# Patient Record
Sex: FEMALE | Race: WHITE | Employment: OTHER | URBAN - METROPOLITAN AREA
[De-identification: names, ages, dates, MRNs, and addresses within clinical notes are randomized per-mention and may not be internally consistent; named-entity substitution may affect disease eponyms.]

---

## 2024-05-02 ENCOUNTER — APPOINTMENT (OUTPATIENT)
Dept: CT IMAGING | Age: 84
DRG: 193 | End: 2024-05-02
Payer: MEDICARE

## 2024-05-02 ENCOUNTER — APPOINTMENT (OUTPATIENT)
Dept: GENERAL RADIOLOGY | Age: 84
DRG: 193 | End: 2024-05-02
Payer: MEDICARE

## 2024-05-02 ENCOUNTER — HOSPITAL ENCOUNTER (INPATIENT)
Age: 84
LOS: 2 days | Discharge: HOME | DRG: 193 | End: 2024-05-04
Attending: EMERGENCY MEDICINE | Admitting: STUDENT IN AN ORGANIZED HEALTH CARE EDUCATION/TRAINING PROGRAM
Payer: MEDICARE

## 2024-05-02 DIAGNOSIS — K85.90 ACUTE PANCREATITIS, UNSPECIFIED COMPLICATION STATUS, UNSPECIFIED PANCREATITIS TYPE: ICD-10-CM

## 2024-05-02 DIAGNOSIS — N17.9 AKI (ACUTE KIDNEY INJURY) (HCC): ICD-10-CM

## 2024-05-02 DIAGNOSIS — J18.9 PNEUMONIA OF BOTH LOWER LOBES DUE TO INFECTIOUS ORGANISM: ICD-10-CM

## 2024-05-02 DIAGNOSIS — R19.7 DIARRHEA, UNSPECIFIED TYPE: Primary | ICD-10-CM

## 2024-05-02 DIAGNOSIS — E87.20 METABOLIC ACIDOSIS: ICD-10-CM

## 2024-05-02 DIAGNOSIS — K29.90 GASTRITIS AND DUODENITIS: ICD-10-CM

## 2024-05-02 DIAGNOSIS — E87.1 HYPONATREMIA: ICD-10-CM

## 2024-05-02 LAB
ALBUMIN SERPL-MCNC: 4 G/DL (ref 3.5–5.2)
ALBUMIN/GLOB SERPL: 1.1 {RATIO} (ref 1–2.5)
ALP SERPL-CCNC: 95 U/L (ref 35–104)
ALT SERPL-CCNC: 11 U/L (ref 5–33)
ANION GAP SERPL CALCULATED.3IONS-SCNC: 19 MMOL/L (ref 9–17)
AST SERPL-CCNC: 21 U/L
BASOPHILS # BLD: 0.2 K/UL (ref 0–0.2)
BASOPHILS NFR BLD: 1 % (ref 0–2)
BILIRUB SERPL-MCNC: 0.3 MG/DL (ref 0.3–1.2)
BILIRUB UR QL STRIP: NEGATIVE
BUN SERPL-MCNC: 31 MG/DL (ref 8–23)
CALCIUM SERPL-MCNC: 10 MG/DL (ref 8.6–10.4)
CHLORIDE SERPL-SCNC: 84 MMOL/L (ref 98–107)
CLARITY UR: CLEAR
CO2 SERPL-SCNC: 19 MMOL/L (ref 20–31)
COLOR UR: YELLOW
CREAT SERPL-MCNC: 1.6 MG/DL (ref 0.5–0.9)
EOSINOPHIL # BLD: 0.2 K/UL (ref 0–0.4)
EOSINOPHILS RELATIVE PERCENT: 1 % (ref 1–4)
EPI CELLS #/AREA URNS HPF: ABNORMAL /HPF (ref 0–5)
ERYTHROCYTE [DISTWIDTH] IN BLOOD BY AUTOMATED COUNT: 15 % (ref 12.5–15.4)
GFR, ESTIMATED: 32 ML/MIN/1.73M2
GLUCOSE SERPL-MCNC: 114 MG/DL (ref 70–99)
GLUCOSE UR STRIP-MCNC: NEGATIVE MG/DL
HCT VFR BLD AUTO: 34.8 % (ref 36–46)
HGB BLD-MCNC: 11.9 G/DL (ref 12–16)
HGB UR QL STRIP.AUTO: ABNORMAL
KETONES UR STRIP-MCNC: NEGATIVE MG/DL
LACTATE BLDV-SCNC: 1 MMOL/L (ref 0.5–2.2)
LEUKOCYTE ESTERASE UR QL STRIP: NEGATIVE
LIPASE SERPL-CCNC: 296 U/L (ref 13–60)
LYMPHOCYTES NFR BLD: 2 K/UL (ref 1–4.8)
LYMPHOCYTES RELATIVE PERCENT: 13 % (ref 24–44)
MAGNESIUM SERPL-MCNC: 1.7 MG/DL (ref 1.6–2.6)
MCH RBC QN AUTO: 32.9 PG (ref 26–34)
MCHC RBC AUTO-ENTMCNC: 34.3 G/DL (ref 31–37)
MCV RBC AUTO: 96 FL (ref 80–100)
MONOCYTES NFR BLD: 0.6 K/UL (ref 0.1–1.2)
MONOCYTES NFR BLD: 4 % (ref 2–11)
NEUTROPHILS NFR BLD: 81 % (ref 36–66)
NEUTS SEG NFR BLD: 13.2 K/UL (ref 1.8–7.7)
NITRITE UR QL STRIP: NEGATIVE
PH UR STRIP: 5.5 [PH] (ref 5–8)
PLATELET # BLD AUTO: 746 K/UL (ref 140–450)
PMV BLD AUTO: 5.9 FL (ref 6–12)
POTASSIUM SERPL-SCNC: 4.1 MMOL/L (ref 3.7–5.3)
PROT SERPL-MCNC: 7.8 G/DL (ref 6.4–8.3)
PROT UR STRIP-MCNC: NEGATIVE MG/DL
RBC # BLD AUTO: 3.62 M/UL (ref 4–5.2)
RBC #/AREA URNS HPF: ABNORMAL /HPF (ref 0–2)
SODIUM SERPL-SCNC: 122 MMOL/L (ref 135–144)
SODIUM SERPL-SCNC: 125 MMOL/L (ref 135–144)
SP GR UR STRIP: 1.02 (ref 1–1.03)
TROPONIN I SERPL HS-MCNC: 14 NG/L (ref 0–14)
UROBILINOGEN UR STRIP-ACNC: NORMAL EU/DL (ref 0–1)
WBC #/AREA URNS HPF: ABNORMAL /HPF (ref 0–5)
WBC OTHER # BLD: 16.3 K/UL (ref 3.5–11)

## 2024-05-02 PROCEDURE — 2060000000 HC ICU INTERMEDIATE R&B

## 2024-05-02 PROCEDURE — 87449 NOS EACH ORGANISM AG IA: CPT

## 2024-05-02 PROCEDURE — 2580000003 HC RX 258: Performed by: NURSE PRACTITIONER

## 2024-05-02 PROCEDURE — 36415 COLL VENOUS BLD VENIPUNCTURE: CPT

## 2024-05-02 PROCEDURE — 2580000003 HC RX 258: Performed by: EMERGENCY MEDICINE

## 2024-05-02 PROCEDURE — 84484 ASSAY OF TROPONIN QUANT: CPT

## 2024-05-02 PROCEDURE — 99285 EMERGENCY DEPT VISIT HI MDM: CPT

## 2024-05-02 PROCEDURE — 6360000004 HC RX CONTRAST MEDICATION: Performed by: PHYSICIAN ASSISTANT

## 2024-05-02 PROCEDURE — 83605 ASSAY OF LACTIC ACID: CPT

## 2024-05-02 PROCEDURE — 71045 X-RAY EXAM CHEST 1 VIEW: CPT

## 2024-05-02 PROCEDURE — 85025 COMPLETE CBC W/AUTO DIFF WBC: CPT

## 2024-05-02 PROCEDURE — 84295 ASSAY OF SERUM SODIUM: CPT

## 2024-05-02 PROCEDURE — 80053 COMPREHEN METABOLIC PANEL: CPT

## 2024-05-02 PROCEDURE — A4216 STERILE WATER/SALINE, 10 ML: HCPCS | Performed by: NURSE PRACTITIONER

## 2024-05-02 PROCEDURE — 83735 ASSAY OF MAGNESIUM: CPT

## 2024-05-02 PROCEDURE — 6360000002 HC RX W HCPCS: Performed by: EMERGENCY MEDICINE

## 2024-05-02 PROCEDURE — 74177 CT ABD & PELVIS W/CONTRAST: CPT

## 2024-05-02 PROCEDURE — 6360000002 HC RX W HCPCS: Performed by: NURSE PRACTITIONER

## 2024-05-02 PROCEDURE — 87899 AGENT NOS ASSAY W/OPTIC: CPT

## 2024-05-02 PROCEDURE — 2580000003 HC RX 258: Performed by: PHYSICIAN ASSISTANT

## 2024-05-02 PROCEDURE — 81001 URINALYSIS AUTO W/SCOPE: CPT

## 2024-05-02 PROCEDURE — 83690 ASSAY OF LIPASE: CPT

## 2024-05-02 PROCEDURE — 2500000003 HC RX 250 WO HCPCS: Performed by: NURSE PRACTITIONER

## 2024-05-02 RX ORDER — AZITHROMYCIN 250 MG/1
500 TABLET, FILM COATED ORAL EVERY 24 HOURS
Status: DISCONTINUED | OUTPATIENT
Start: 2024-05-03 | End: 2024-05-04 | Stop reason: HOSPADM

## 2024-05-02 RX ORDER — ACETAMINOPHEN 650 MG/1
650 SUPPOSITORY RECTAL EVERY 6 HOURS PRN
Status: DISCONTINUED | OUTPATIENT
Start: 2024-05-02 | End: 2024-05-04 | Stop reason: HOSPADM

## 2024-05-02 RX ORDER — HYDROCODONE BITARTRATE AND ACETAMINOPHEN 5; 325 MG/1; MG/1
TABLET ORAL
Status: ON HOLD | COMMUNITY
Start: 2022-09-26 | End: 2024-05-04 | Stop reason: HOSPADM

## 2024-05-02 RX ORDER — ENOXAPARIN SODIUM 100 MG/ML
30 INJECTION SUBCUTANEOUS DAILY
Status: DISCONTINUED | OUTPATIENT
Start: 2024-05-02 | End: 2024-05-04 | Stop reason: HOSPADM

## 2024-05-02 RX ORDER — ACETAMINOPHEN/DIPHENHYDRAMINE 500MG-25MG
1 TABLET ORAL NIGHTLY PRN
COMMUNITY

## 2024-05-02 RX ORDER — ACETAMINOPHEN 325 MG/1
650 TABLET ORAL EVERY 6 HOURS PRN
Status: DISCONTINUED | OUTPATIENT
Start: 2024-05-02 | End: 2024-05-04 | Stop reason: HOSPADM

## 2024-05-02 RX ORDER — ONDANSETRON 2 MG/ML
4 INJECTION INTRAMUSCULAR; INTRAVENOUS EVERY 6 HOURS PRN
Status: DISCONTINUED | OUTPATIENT
Start: 2024-05-02 | End: 2024-05-04 | Stop reason: HOSPADM

## 2024-05-02 RX ORDER — ONDANSETRON 4 MG/1
4 TABLET, ORALLY DISINTEGRATING ORAL EVERY 8 HOURS PRN
Status: DISCONTINUED | OUTPATIENT
Start: 2024-05-02 | End: 2024-05-04 | Stop reason: HOSPADM

## 2024-05-02 RX ORDER — AZATHIOPRINE 50 MG/1
50 TABLET ORAL 2 TIMES DAILY
Status: DISCONTINUED | OUTPATIENT
Start: 2024-05-02 | End: 2024-05-03

## 2024-05-02 RX ORDER — AZATHIOPRINE 50 MG/1
TABLET ORAL
Status: ON HOLD | COMMUNITY
Start: 2023-08-21 | End: 2024-05-04 | Stop reason: HOSPADM

## 2024-05-02 RX ORDER — SODIUM CHLORIDE 0.9 % (FLUSH) 0.9 %
5-40 SYRINGE (ML) INJECTION EVERY 12 HOURS SCHEDULED
Status: DISCONTINUED | OUTPATIENT
Start: 2024-05-02 | End: 2024-05-04 | Stop reason: HOSPADM

## 2024-05-02 RX ORDER — FLUTICASONE PROPIONATE 50 MCG
1 SPRAY, SUSPENSION (ML) NASAL DAILY
COMMUNITY
Start: 2023-03-20

## 2024-05-02 RX ORDER — SODIUM CHLORIDE 0.9 % (FLUSH) 0.9 %
10 SYRINGE (ML) INJECTION PRN
Status: DISCONTINUED | OUTPATIENT
Start: 2024-05-02 | End: 2024-05-04 | Stop reason: HOSPADM

## 2024-05-02 RX ORDER — LISINOPRIL AND HYDROCHLOROTHIAZIDE 25; 20 MG/1; MG/1
1 TABLET ORAL DAILY
COMMUNITY
Start: 2023-08-15

## 2024-05-02 RX ORDER — HYDROCHLOROTHIAZIDE 25 MG/1
25 TABLET ORAL DAILY
Status: DISCONTINUED | OUTPATIENT
Start: 2024-05-03 | End: 2024-05-04 | Stop reason: HOSPADM

## 2024-05-02 RX ORDER — SODIUM CHLORIDE 9 MG/ML
INJECTION, SOLUTION INTRAVENOUS PRN
Status: DISCONTINUED | OUTPATIENT
Start: 2024-05-02 | End: 2024-05-04 | Stop reason: HOSPADM

## 2024-05-02 RX ORDER — FLUTICASONE PROPIONATE 50 MCG
1 SPRAY, SUSPENSION (ML) NASAL DAILY
Status: DISCONTINUED | OUTPATIENT
Start: 2024-05-02 | End: 2024-05-04 | Stop reason: HOSPADM

## 2024-05-02 RX ORDER — LANOLIN ALCOHOL/MO/W.PET/CERES
CREAM (GRAM) TOPICAL
COMMUNITY

## 2024-05-02 RX ORDER — ALBUTEROL SULFATE 2.5 MG/3ML
2.5 SOLUTION RESPIRATORY (INHALATION)
Status: DISCONTINUED | OUTPATIENT
Start: 2024-05-02 | End: 2024-05-04 | Stop reason: HOSPADM

## 2024-05-02 RX ORDER — SODIUM CHLORIDE 9 MG/ML
INJECTION, SOLUTION INTRAVENOUS CONTINUOUS
Status: DISCONTINUED | OUTPATIENT
Start: 2024-05-02 | End: 2024-05-04 | Stop reason: HOSPADM

## 2024-05-02 RX ORDER — IPRATROPIUM BROMIDE AND ALBUTEROL SULFATE 2.5; .5 MG/3ML; MG/3ML
1 SOLUTION RESPIRATORY (INHALATION)
Status: DISCONTINUED | OUTPATIENT
Start: 2024-05-03 | End: 2024-05-03

## 2024-05-02 RX ORDER — SODIUM CHLORIDE 0.9 % (FLUSH) 0.9 %
10 SYRINGE (ML) INJECTION 2 TIMES DAILY
Status: DISCONTINUED | OUTPATIENT
Start: 2024-05-02 | End: 2024-05-04 | Stop reason: HOSPADM

## 2024-05-02 RX ORDER — 0.9 % SODIUM CHLORIDE 0.9 %
80 INTRAVENOUS SOLUTION INTRAVENOUS ONCE
Status: DISCONTINUED | OUTPATIENT
Start: 2024-05-02 | End: 2024-05-04 | Stop reason: HOSPADM

## 2024-05-02 RX ORDER — ZOLPIDEM TARTRATE 5 MG/1
1 TABLET ORAL NIGHTLY PRN
COMMUNITY
Start: 2023-09-15

## 2024-05-02 RX ORDER — 0.9 % SODIUM CHLORIDE 0.9 %
1000 INTRAVENOUS SOLUTION INTRAVENOUS ONCE
Status: COMPLETED | OUTPATIENT
Start: 2024-05-02 | End: 2024-05-02

## 2024-05-02 RX ORDER — LISINOPRIL AND HYDROCHLOROTHIAZIDE 25; 20 MG/1; MG/1
1 TABLET ORAL DAILY
Status: DISCONTINUED | OUTPATIENT
Start: 2024-05-02 | End: 2024-05-02 | Stop reason: CLARIF

## 2024-05-02 RX ORDER — LISINOPRIL 20 MG/1
20 TABLET ORAL DAILY
Status: DISCONTINUED | OUTPATIENT
Start: 2024-05-03 | End: 2024-05-04 | Stop reason: HOSPADM

## 2024-05-02 RX ORDER — LABETALOL HYDROCHLORIDE 5 MG/ML
20 INJECTION, SOLUTION INTRAVENOUS ONCE
Status: COMPLETED | OUTPATIENT
Start: 2024-05-02 | End: 2024-05-02

## 2024-05-02 RX ORDER — ZOLPIDEM TARTRATE 5 MG/1
5 TABLET ORAL NIGHTLY PRN
Status: DISCONTINUED | OUTPATIENT
Start: 2024-05-02 | End: 2024-05-04 | Stop reason: HOSPADM

## 2024-05-02 RX ADMIN — AZITHROMYCIN MONOHYDRATE 500 MG: 500 INJECTION, POWDER, LYOPHILIZED, FOR SOLUTION INTRAVENOUS at 21:05

## 2024-05-02 RX ADMIN — FAMOTIDINE 20 MG: 10 INJECTION, SOLUTION INTRAVENOUS at 21:36

## 2024-05-02 RX ADMIN — IOPAMIDOL 75 ML: 755 INJECTION, SOLUTION INTRAVENOUS at 17:38

## 2024-05-02 RX ADMIN — LABETALOL HYDROCHLORIDE 20 MG: 5 INJECTION, SOLUTION INTRAVENOUS at 22:25

## 2024-05-02 RX ADMIN — CEFTRIAXONE SODIUM 1000 MG: 1 INJECTION, POWDER, FOR SOLUTION INTRAMUSCULAR; INTRAVENOUS at 19:43

## 2024-05-02 RX ADMIN — ENOXAPARIN SODIUM 30 MG: 100 INJECTION SUBCUTANEOUS at 21:20

## 2024-05-02 RX ADMIN — Medication 100 ML: at 17:37

## 2024-05-02 RX ADMIN — SODIUM CHLORIDE 1000 ML: 9 INJECTION, SOLUTION INTRAVENOUS at 15:50

## 2024-05-02 RX ADMIN — ONDANSETRON 4 MG: 2 INJECTION INTRAMUSCULAR; INTRAVENOUS at 22:25

## 2024-05-02 RX ADMIN — SODIUM CHLORIDE: 9 INJECTION, SOLUTION INTRAVENOUS at 20:59

## 2024-05-02 RX ADMIN — AZATHIOPRINE 50 MG: 50 TABLET ORAL at 21:36

## 2024-05-02 RX ADMIN — SODIUM CHLORIDE, PRESERVATIVE FREE 10 ML: 5 INJECTION INTRAVENOUS at 21:20

## 2024-05-02 ASSESSMENT — PAIN - FUNCTIONAL ASSESSMENT
PAIN_FUNCTIONAL_ASSESSMENT: NONE - DENIES PAIN
PAIN_FUNCTIONAL_ASSESSMENT: NONE - DENIES PAIN

## 2024-05-02 NOTE — ED PROVIDER NOTES
are present in the second portion of the duodenum which can be   seen with duodenitis.  No evidence of acute inflammatory changes seen more   distally in the small bowel.   2. Small amount of ascites.   3. Patchy bilateral airspace opacities in the lung bases, most concerning for   an infectious process.  Close radiographic follow-up until resolution is   recommended.             LABS: All lab results were reviewed by myself, and all abnormals are listed below.  Labs Reviewed   CBC WITH AUTO DIFFERENTIAL - Abnormal; Notable for the following components:       Result Value    WBC 16.3 (*)     RBC 3.62 (*)     Hemoglobin 11.9 (*)     Hematocrit 34.8 (*)     Platelets 746 (*)     MPV 5.9 (*)     Neutrophils % 81 (*)     Lymphocytes % 13 (*)     Neutrophils Absolute 13.20 (*)     All other components within normal limits   COMPREHENSIVE METABOLIC PANEL - Abnormal; Notable for the following components:    Sodium 122 (*)     Chloride 84 (*)     CO2 19 (*)     Anion Gap 19 (*)     Glucose 114 (*)     BUN 31 (*)     Creatinine 1.6 (*)     Est, Glom Filt Rate 32 (*)     All other components within normal limits   LIPASE - Abnormal; Notable for the following components:    Lipase 296 (*)     All other components within normal limits   STREP PNEUMONIAE ANTIGEN   LEGIONELLA ANTIGEN, URINE   LACTIC ACID   MAGNESIUM   TROPONIN   URINALYSIS WITH MICROSCOPIC       CONSULTS:  IP CONSULT TO HOSPITALIST    FINAL IMPRESSION      1. Diarrhea, unspecified type    2. YULIA (acute kidney injury) (HCC)    3. Hyponatremia    4. Metabolic acidosis    5. Acute pancreatitis, unspecified complication status, unspecified pancreatitis type    6. Gastritis and duodenitis    7. Pneumonia of both lower lobes due to infectious organism        PASTMEDICAL HISTORY     Past Medical History:   Diagnosis Date    Acute Crohn's disease (HCC)     Hypertension      SURGICAL HISTORY     No past surgical history on file.  CURRENT MEDICATIONS       Previous

## 2024-05-02 NOTE — ED PROVIDER NOTES
EMERGENCY DEPARTMENT ENCOUNTER      Pt Name: Valerie Young  MRN: [id number]  Birthdate 1940  Date of evaluation: 5/2/2024  Provider: Moody Flores PA-C    CHIEF COMPLAINT       Chief Complaint   Patient presents with    Diarrhea     Watery diarrhea onset a week ago wed- denies seeing any blood in stool  Pt states anytime she eats or drinks it runs straight through her  The last two days there has been a decrease in her fluid and food intake    Abdominal Pain     Mid to lower abd intermittent cramping    Fatigue    Cough     Onset a week ago tues- green phlegm   Cough is not as often per pt         HISTORY OF PRESENT ILLNESS      Valerie Young is a 84 y.o. female who presents to the emergency department complaining of diarrhea for the past week.  Patient has a history of Crohn's disease, she states that anytime she eats something she gets instant diarrhea.  She has decreased her appetite, she is complaining of some mid to lower abdominal intermittent cramping but is not that severe.  She denies any blood in her stool, denies any nausea or vomiting, denies any fevers or chills.  She contacted her gastroenterologist who told her to come for possible IV fluid for dehydration.        REVIEW OF SYSTEMS       Review of Systems   AS STATED IN HPI      PAST MEDICAL HISTORY     Past Medical History:   Diagnosis Date    Acute Crohn's disease (HCC)     Hypertension          SURGICAL HISTORY       History reviewed. No pertinent surgical history.      CURRENT MEDICATIONS       Previous Medications    AZATHIOPRINE (IMURAN) 50 MG TABLET    TAKE 1 TABLET BY MOUTH IN THE MORNING AND  ONE  TABLET  BEFORE  BEDTIME    CALCIUM CITRATE-VITAMIN D (CITRACAL+D) 315-5 MG-MCG TABS PER TABLET    0 tablets.    DIPHENHYDRAMINE-APAP, SLEEP, (TYLENOL PM EXTRA STRENGTH)  MG TABLET    Take 1 tablet by mouth nightly as needed for Sleep    FLUTICASONE (FLONASE) 50 MCG/ACT NASAL SPRAY    1 spray by Nasal route daily     her fluid and food intake    Abdominal Pain     Mid to lower abd intermittent cramping    Fatigue    Cough     Onset a week ago tues- green phlegm   Cough is not as often per pt          2)  Data Reviewed      Ekg:     LABS:  Labs Reviewed   CBC WITH AUTO DIFFERENTIAL - Abnormal; Notable for the following components:       Result Value    WBC 16.3 (*)     RBC 3.62 (*)     Hemoglobin 11.9 (*)     Hematocrit 34.8 (*)     Platelets 746 (*)     MPV 5.9 (*)     Neutrophils % 81 (*)     Lymphocytes % 13 (*)     Neutrophils Absolute 13.20 (*)     All other components within normal limits   COMPREHENSIVE METABOLIC PANEL - Abnormal; Notable for the following components:    Sodium 122 (*)     Chloride 84 (*)     CO2 19 (*)     Anion Gap 19 (*)     Glucose 114 (*)     BUN 31 (*)     Creatinine 1.6 (*)     Est, Glom Filt Rate 32 (*)     All other components within normal limits   LIPASE - Abnormal; Notable for the following components:    Lipase 296 (*)     All other components within normal limits   STREP PNEUMONIAE ANTIGEN   LEGIONELLA ANTIGEN, URINE   LACTIC ACID   MAGNESIUM   TROPONIN   URINALYSIS WITH MICROSCOPIC       RADIOLOGY:   All plain film, CT, MRI, and formal ultrasound images (except ED bedside ultrasound) are read by the radiologist  XR CHEST PORTABLE   Final Result   No acute process.         CT ABDOMEN PELVIS W IV CONTRAST Additional Contrast? None   Preliminary Result   1. Changes are present in the second portion of the duodenum which can be   seen with duodenitis.  No evidence of acute inflammatory changes seen more   distally in the small bowel.   2. Small amount of ascites.   3. Patchy bilateral airspace opacities in the lung bases, most concerning for   an infectious process.  Close radiographic follow-up until resolution is   recommended.             No results found.      3)  Treatment and Disposition    Patient repeat assessment:  stable    Due to the patient's hyponatremia, we discussed admission.

## 2024-05-03 ENCOUNTER — APPOINTMENT (OUTPATIENT)
Dept: GENERAL RADIOLOGY | Age: 84
DRG: 193 | End: 2024-05-03
Payer: MEDICARE

## 2024-05-03 PROBLEM — K29.90 GASTRITIS AND DUODENITIS: Status: ACTIVE | Noted: 2024-05-03

## 2024-05-03 PROBLEM — D75.839 THROMBOCYTOSIS: Status: ACTIVE | Noted: 2024-05-03

## 2024-05-03 PROBLEM — I10 HTN (HYPERTENSION): Status: ACTIVE | Noted: 2018-07-11

## 2024-05-03 PROBLEM — R19.7 DIARRHEA OF PRESUMED INFECTIOUS ORIGIN: Status: ACTIVE | Noted: 2024-05-03

## 2024-05-03 PROBLEM — E87.1 HYPONATREMIA: Status: ACTIVE | Noted: 2024-05-03

## 2024-05-03 PROBLEM — K85.90 ACUTE PANCREATITIS: Status: ACTIVE | Noted: 2024-05-03

## 2024-05-03 PROBLEM — E87.20 METABOLIC ACIDOSIS: Status: ACTIVE | Noted: 2024-05-03

## 2024-05-03 PROBLEM — N17.9 AKI (ACUTE KIDNEY INJURY) (HCC): Status: ACTIVE | Noted: 2024-05-03

## 2024-05-03 LAB
ANION GAP SERPL CALCULATED.3IONS-SCNC: 13 MMOL/L (ref 9–17)
BASOPHILS # BLD: 0.2 K/UL (ref 0–0.2)
BASOPHILS NFR BLD: 1 % (ref 0–2)
BUN SERPL-MCNC: 27 MG/DL (ref 8–23)
CALCIUM SERPL-MCNC: 8.8 MG/DL (ref 8.6–10.4)
CHLORIDE SERPL-SCNC: 97 MMOL/L (ref 98–107)
CO2 SERPL-SCNC: 19 MMOL/L (ref 20–31)
CREAT SERPL-MCNC: 1.3 MG/DL (ref 0.5–0.9)
CRP SERPL HS-MCNC: 38.2 MG/L (ref 0–5)
EOSINOPHIL # BLD: 0.1 K/UL (ref 0–0.4)
EOSINOPHILS RELATIVE PERCENT: 1 % (ref 1–4)
ERYTHROCYTE [DISTWIDTH] IN BLOOD BY AUTOMATED COUNT: 15.1 % (ref 12.5–15.4)
ERYTHROCYTE [SEDIMENTATION RATE] IN BLOOD BY PHOTOMETRIC METHOD: 42 MM/HR (ref 0–30)
GFR, ESTIMATED: 41 ML/MIN/1.73M2
GLUCOSE SERPL-MCNC: 100 MG/DL (ref 70–99)
HCT VFR BLD AUTO: 29.4 % (ref 36–46)
HGB BLD-MCNC: 10.1 G/DL (ref 12–16)
L PNEUMO1 AG UR QL IA.RAPID: NEGATIVE
LYMPHOCYTES NFR BLD: 1.7 K/UL (ref 1–4.8)
LYMPHOCYTES RELATIVE PERCENT: 12 % (ref 24–44)
MCH RBC QN AUTO: 33.4 PG (ref 26–34)
MCHC RBC AUTO-ENTMCNC: 34.5 G/DL (ref 31–37)
MCV RBC AUTO: 96.8 FL (ref 80–100)
MONOCYTES NFR BLD: 0.6 K/UL (ref 0.1–1.2)
MONOCYTES NFR BLD: 4 % (ref 2–11)
NEUTROPHILS NFR BLD: 82 % (ref 36–66)
NEUTS SEG NFR BLD: 11.5 K/UL (ref 1.8–7.7)
PLATELET # BLD AUTO: 603 K/UL (ref 140–450)
PMV BLD AUTO: 5.8 FL (ref 6–12)
POTASSIUM SERPL-SCNC: 4.1 MMOL/L (ref 3.7–5.3)
RBC # BLD AUTO: 3.04 M/UL (ref 4–5.2)
S PNEUM AG SPEC QL: NEGATIVE
SODIUM SERPL-SCNC: 129 MMOL/L (ref 135–144)
SODIUM SERPL-SCNC: 131 MMOL/L (ref 135–144)
SPECIMEN SOURCE: NORMAL
WBC OTHER # BLD: 14.1 K/UL (ref 3.5–11)

## 2024-05-03 PROCEDURE — 83993 ASSAY FOR CALPROTECTIN FECAL: CPT

## 2024-05-03 PROCEDURE — 2500000003 HC RX 250 WO HCPCS: Performed by: NURSE PRACTITIONER

## 2024-05-03 PROCEDURE — 85652 RBC SED RATE AUTOMATED: CPT

## 2024-05-03 PROCEDURE — 80048 BASIC METABOLIC PNL TOTAL CA: CPT

## 2024-05-03 PROCEDURE — 94667 MNPJ CHEST WALL 1ST: CPT

## 2024-05-03 PROCEDURE — 2060000000 HC ICU INTERMEDIATE R&B

## 2024-05-03 PROCEDURE — 86140 C-REACTIVE PROTEIN: CPT

## 2024-05-03 PROCEDURE — 87449 NOS EACH ORGANISM AG IA: CPT

## 2024-05-03 PROCEDURE — 71046 X-RAY EXAM CHEST 2 VIEWS: CPT

## 2024-05-03 PROCEDURE — 6370000000 HC RX 637 (ALT 250 FOR IP): Performed by: NURSE PRACTITIONER

## 2024-05-03 PROCEDURE — 36415 COLL VENOUS BLD VENIPUNCTURE: CPT

## 2024-05-03 PROCEDURE — 6360000002 HC RX W HCPCS: Performed by: STUDENT IN AN ORGANIZED HEALTH CARE EDUCATION/TRAINING PROGRAM

## 2024-05-03 PROCEDURE — 87506 IADNA-DNA/RNA PROBE TQ 6-11: CPT

## 2024-05-03 PROCEDURE — 85025 COMPLETE CBC W/AUTO DIFF WBC: CPT

## 2024-05-03 PROCEDURE — A4216 STERILE WATER/SALINE, 10 ML: HCPCS | Performed by: NURSE PRACTITIONER

## 2024-05-03 PROCEDURE — 6360000002 HC RX W HCPCS: Performed by: NURSE PRACTITIONER

## 2024-05-03 PROCEDURE — 84295 ASSAY OF SERUM SODIUM: CPT

## 2024-05-03 PROCEDURE — 87324 CLOSTRIDIUM AG IA: CPT

## 2024-05-03 PROCEDURE — 99222 1ST HOSP IP/OBS MODERATE 55: CPT | Performed by: NURSE PRACTITIONER

## 2024-05-03 PROCEDURE — 99223 1ST HOSP IP/OBS HIGH 75: CPT | Performed by: STUDENT IN AN ORGANIZED HEALTH CARE EDUCATION/TRAINING PROGRAM

## 2024-05-03 PROCEDURE — 2580000003 HC RX 258: Performed by: NURSE PRACTITIONER

## 2024-05-03 PROCEDURE — 6370000000 HC RX 637 (ALT 250 FOR IP): Performed by: STUDENT IN AN ORGANIZED HEALTH CARE EDUCATION/TRAINING PROGRAM

## 2024-05-03 RX ORDER — METRONIDAZOLE 500 MG/100ML
500 INJECTION, SOLUTION INTRAVENOUS EVERY 8 HOURS
Status: DISCONTINUED | OUTPATIENT
Start: 2024-05-03 | End: 2024-05-04 | Stop reason: HOSPADM

## 2024-05-03 RX ORDER — IPRATROPIUM BROMIDE AND ALBUTEROL SULFATE 2.5; .5 MG/3ML; MG/3ML
1 SOLUTION RESPIRATORY (INHALATION) EVERY 4 HOURS PRN
Status: DISCONTINUED | OUTPATIENT
Start: 2024-05-03 | End: 2024-05-04 | Stop reason: HOSPADM

## 2024-05-03 RX ORDER — CIPROFLOXACIN 2 MG/ML
400 INJECTION, SOLUTION INTRAVENOUS EVERY 12 HOURS
Status: DISCONTINUED | OUTPATIENT
Start: 2024-05-03 | End: 2024-05-04 | Stop reason: HOSPADM

## 2024-05-03 RX ADMIN — SODIUM CHLORIDE, PRESERVATIVE FREE 10 ML: 5 INJECTION INTRAVENOUS at 20:40

## 2024-05-03 RX ADMIN — HYDROCHLOROTHIAZIDE 25 MG: 25 TABLET ORAL at 08:05

## 2024-05-03 RX ADMIN — LISINOPRIL 20 MG: 20 TABLET ORAL at 08:04

## 2024-05-03 RX ADMIN — AZITHROMYCIN DIHYDRATE 500 MG: 250 TABLET, FILM COATED ORAL at 20:33

## 2024-05-03 RX ADMIN — FAMOTIDINE 20 MG: 10 INJECTION, SOLUTION INTRAVENOUS at 08:04

## 2024-05-03 RX ADMIN — METRONIDAZOLE 500 MG: 500 INJECTION, SOLUTION INTRAVENOUS at 20:37

## 2024-05-03 RX ADMIN — ENOXAPARIN SODIUM 30 MG: 100 INJECTION SUBCUTANEOUS at 08:09

## 2024-05-03 RX ADMIN — AZATHIOPRINE 50 MG: 50 TABLET ORAL at 08:04

## 2024-05-03 RX ADMIN — CIPROFLOXACIN 400 MG: 400 INJECTION, SOLUTION INTRAVENOUS at 19:01

## 2024-05-03 NOTE — PLAN OF CARE
Problem: Discharge Planning  Goal: Discharge to home or other facility with appropriate resources  5/3/2024 1425 by Subha Roach RN  Outcome: Progressing  Flowsheets (Taken 5/3/2024 1425)  Discharge to home or other facility with appropriate resources:   Identify barriers to discharge with patient and caregiver   Arrange for needed discharge resources and transportation as appropriate   Identify discharge learning needs (meds, wound care, etc)   Refer to discharge planning if patient needs post-hospital services based on physician order or complex needs related to functional status, cognitive ability or social support system  5/3/2024 0455 by Awa Camarillo, RN  Outcome: Progressing  Flowsheets (Taken 5/2/2024 2045)  Discharge to home or other facility with appropriate resources: Arrange for needed discharge resources and transportation as appropriate     Problem: Safety - Adult  Goal: Free from fall injury  5/3/2024 1425 by Subha Roach RN  Outcome: Progressing  Flowsheets (Taken 5/3/2024 1425)  Free From Fall Injury:   Instruct family/caregiver on patient safety   Based on caregiver fall risk screen, instruct family/caregiver to ask for assistance with transferring infant if caregiver noted to have fall risk factors  5/3/2024 0455 by Awa Camarillo, RN  Outcome: Progressing

## 2024-05-03 NOTE — PROGRESS NOTES
Physical Therapy        Physical Therapy Cancel Note      DATE: 5/3/2024    NAME: Valerie Young  MRN: [id number]   : 1940      Patient not seen this date for Physical Therapy due to:    Patient independent with functional mobility. Will defer PT evaluation at this time. Please reorder PT if future needs arise.       Electronically signed by Shalini Pelletier PT on 5/3/2024 at 9:12 AM

## 2024-05-03 NOTE — PLAN OF CARE
Problem: Discharge Planning  Goal: Discharge to home or other facility with appropriate resources  Outcome: Progressing  Flowsheets (Taken 5/2/2024 2045)  Discharge to home or other facility with appropriate resources: Arrange for needed discharge resources and transportation as appropriate     Problem: Safety - Adult  Goal: Free from fall injury  Outcome: Progressing

## 2024-05-03 NOTE — H&P
[facility]  Office: [phone]  Willis Sutherland DO, Edu Sood DO, Jett Ramirez DO, Grzegorz Sterling DO, Tri Croft MD, Melodei Hatfield MD, Aleyda Lopez MD, Ailyn Diggs MD,  Robe Clarke MD, Juani Hinojosa MD, Addi Mauro DO, Mana Day MD,  Esteban Wolf DO, Joe Pinedo MD, Yaw Torres MD, Carter Sutherland DO, Tonja Medina MD,  Noman Tovar DO, Nina Bates MD, Nai Ching MD, Candy Novak MD, Karol Paetl MD,  Pj Jacobs MD, Charo Beltran MD, Ena Roth MD, Jasen Seth DO, Margo Whitmore MD,  Raymon Brito MD, Shirley Waterhouse, CNP,  Carmina Cummins, CNP, Namraat Beltran, CNP, Vineet Murry, CNP,  Christianne Chan, DNP, Swathi Paiz, CNP, Reanna Tolbert, CNP, Kelsea Huang, CNP, Lydia Esquivel, CNP, Arabella Dick, CNP, Chad Gaston PA-C, Linda Hernandez, CNS, Luann Pretty, CNP, Kendy Perez, CNP         [facility]   IN-PATIENT SERVICE   [facility]    HISTORY AND PHYSICAL EXAMINATION            Date:   5/3/2024  Patient name:  Valerie Young  Date of admission:  5/2/2024  3:06 PM  MRN:   [id number]  Account:  [id number]  YOB: 1940  PCP:    No primary care provider on file.  Room:   [address] [place] [address]  Code Status:    Full Code      History Obtained From:     patient, electronic medical record    History of Present Illness:     Valerie Young is a 84 y.o. Non- / non  female who presents with Diarrhea (Watery diarrhea onset a week ago wed- denies seeing any blood in stool/Pt states anytime she eats or drinks it runs straight through her/The last two days there has been a decrease in her fluid and food intake), Abdominal Pain (Mid to lower abd intermittent cramping), Fatigue, and Cough (Onset a week ago tues- green phlegm /Cough is not as often per pt)   and is admitted to the hospital for the management of Diarrhea of presumed infectious origin.    Patient with past medical history of Crohn's disease and

## 2024-05-03 NOTE — CONSULTS
Gastroenterology Consult Note    Patient:   Valerie Young   Admit date:  5/2/2024  Facility:   [facility]  Referring/PCP: No primary care provider on file.  Date:     5/3/2024  Consultant:   GIOVANNA Bhardwaj NP    Subjective:     This 84 y.o. female was admitted 5/2/2024 with a diagnosis of \"Hyponatremia [E87.1]  Metabolic acidosis [E87.20]  Gastritis and duodenitis [K29.90]  YULIA (acute kidney injury) (HCC) [N17.9]  Pneumonia of both lower lobes due to infectious organism [J18.9]  Pneumonia due to infectious organism [J18.9]  Diarrhea, unspecified type [R19.7]  Acute pancreatitis, unspecified complication status, unspecified pancreatitis type [K85.90]\" and is seen in consultation regarding   Chief Complaint   Patient presents with    Diarrhea     Watery diarrhea onset a week ago wed- denies seeing any blood in stool  Pt states anytime she eats or drinks it runs straight through her  The last two days there has been a decrease in her fluid and food intake    Abdominal Pain     Mid to lower abd intermittent cramping    Fatigue    Cough     Onset a week ago tues- green phlegm   Cough is not as often per pt     84/F w/pmhx of Crohn's, HTN presents to ED w/ c/o cough, fatigue, abdominal pain, diarrhea.    Patient reports onset of diarrhea 2 weeks ago.  States initially started with cough, congestion, and following this she developed diarrhea.  Reports on average 5-7 bowel movements a day.  Loose, watery.  Patient denies any melena, hematochezia.  Reports bowel movements occur anytime throughout the day but increased after meals.  Occasional nocturnal bowel movements.  No weight loss.  Also reports decreased appetite.  No nausea, vomiting, fevers, chills.  Has some lower abdominal cramping relieved with bowel movement.  Patient denies any recent antibiotic use.      Patient has long-standing history of Crohn's (2016), prior bowel resection including 100 cm of TI.  Patient has been kept in remission  dizziness and gait problems  Behavioral/Psych: negative for anxiety, depression and mood swings    Objective:     Physical Exam:   BP (!) 171/72   Pulse 94   Temp 97.7 °F (36.5 °C) (Oral)   Resp 18   Ht 1.575 m (5' 2\")   Wt 56.7 kg (125 lb)   SpO2 94%   BMI 22.86 kg/m²      General appearance: alert, appears stated age and cooperative  Skin: Skin color, texture, turgor normal. No rashes or lesions  HEENT:   Neck: no adenopathy, no carotid bruit, no JVD, supple, symmetrical, trachea midline and thyroid not enlarged, symmetric, no tenderness/mass/nodules  Lungs: clear to auscultation bilaterally  Heart: regular rate and rhythm, S1, S2 normal, no murmur, click, rub or gallop  Abdomen: soft, non-tender; bowel sounds normal; no masses,  no organomegaly  Extremities: extremities normal, atraumatic, no cyanosis or edema  Neurologic: Mental status: Alert, oriented, thought content appropriate    Labs:  CBC:   Recent Labs     05/02/24  1550 05/03/24  0306   WBC 16.3* 14.1*   HGB 11.9* 10.1*   * 603*     BMP:    Recent Labs     05/02/24  1550 05/02/24  2118 05/03/24  0306 05/03/24  0845   * 125* 129* 131*   K 4.1  --  4.1  --    CL 84*  --  97*  --    CO2 19*  --  19*  --    BUN 31*  --  27*  --    CREATININE 1.6*  --  1.3*  --    GLUCOSE 114*  --  100*  --      Hepatic:   Recent Labs     05/02/24  1550   AST 21   ALT 11   BILITOT 0.3   ALKPHOS 95     Troponin: No results for input(s): \"TROPONINI\" in the last 72 hours.  BNP: No results for input(s): \"BNP\" in the last 72 hours.  Lipids: No results for input(s): \"CHOL\", \"HDL\" in the last 72 hours.    Invalid input(s): \"LDLCALCU\"  ABGs: No results found for: \"PHART\", \"PO2ART\", \"SUE8PVS\"  INR: No results for input(s): \"INR\" in the last 72 hours.    Recent Labs     05/02/24  1550 05/02/24  2118 05/03/24  0306 05/03/24  0845   WBC 16.3*  --  14.1*  --    HGB 11.9*  --  10.1*  --    MCV 96.0  --  96.8  --    *  --  603*  --    * 125* 129* 131*   K

## 2024-05-03 NOTE — PROGRESS NOTES
Occupational Therapy    [facility]  Occupational Therapy Not Seen Note    DATE: 5/3/2024    NAME: Valerie Young  MRN: [id number]   : 1940      Patient not seen this date for Occupational Therapy due to:    Patient independent with ADLs and functional tasks with no acute OT needs. Will defer OT evaluation at this time. Please reorder OT if future needs arise.     Electronically signed by Ricky Cook OT on 5/3/2024 at 9:06 AM

## 2024-05-03 NOTE — RT PROTOCOL NOTE
RT Inhaler-Nebulizer Bronchodilator Protocol Note    There is a bronchodilator order in the chart from a provider indicating to follow the RT Bronchodilator Protocol and there is an “Initiate RT Inhaler-Nebulizer Bronchodilator Protocol” order as well (see protocol at bottom of note).    CXR Findings:  XR CHEST PORTABLE    Result Date: 5/2/2024  No acute process.       The findings from the last RT Protocol Assessment were as follows:   History Pulmonary Disease: Smoker 15 pack years or more  Respiratory Pattern: Regular pattern and RR 12-20 bpm  Breath Sounds: Clear breath sounds  Cough: Strong, productive  Indication for Bronchodilator Therapy:    Bronchodilator Assessment Score: 2    Aerosolized bronchodilator medication orders have been revised according to the RT Inhaler-Nebulizer Bronchodilator Protocol below.    Respiratory Therapist to perform RT Therapy Protocol Assessment initially then follow the protocol.  Repeat RT Therapy Protocol Assessment PRN for score 0-3 or on second treatment, BID, and PRN for scores above 3.    No Indications - adjust the frequency to every 6 hours PRN wheezing or bronchospasm, if no treatments needed after 48 hours then discontinue using Per Protocol order mode.     If indication present, adjust the RT bronchodilator orders based on the Bronchodilator Assessment Score as indicated below.  Use Inhaler orders unless patient has one or more of the following: on home nebulizer, not able to hold breath for 10 seconds, is not alert and oriented, cannot activate and use MDI correctly, or respiratory rate 25 breaths per minute or more, then use the equivalent nebulizer order(s) with same Frequency and PRN reasons based on the score.  If a patient is on this medication at home then do not decrease Frequency below that used at home.    0-3 - enter or revise RT bronchodilator order(s) to equivalent RT Bronchodilator order with Frequency of every 4 hours PRN for wheezing or increased work

## 2024-05-03 NOTE — PROGRESS NOTES
Pharmacy Note     Renal Dose Adjustment    Valerie Young is a 84 y.o. female. Pharmacist assessment of renally cleared medications.    Recent Labs     05/02/24  1550   BUN 31*       Recent Labs     05/02/24  1550   CREATININE 1.6*       Estimated Creatinine Clearance: 21 mL/min (A) (based on SCr of 1.6 mg/dL (H)).  Estimated CrCl using Ideal Body Weight: 21 mL/min (based on IBW 56.2 kg)    Height:   Ht Readings from Last 1 Encounters:   05/02/24 1.575 m (5' 2\")     Weight:  Wt Readings from Last 1 Encounters:   05/02/24 56.2 kg (123 lb 12.8 oz)       The following medication dose has been adjusted based upon renal function per P&T Guidelines:             Famotidine 20mg twice daily changed to once daily.

## 2024-05-04 VITALS
BODY MASS INDEX: 22.92 KG/M2 | DIASTOLIC BLOOD PRESSURE: 67 MMHG | HEART RATE: 86 BPM | HEIGHT: 62 IN | TEMPERATURE: 98.2 F | SYSTOLIC BLOOD PRESSURE: 165 MMHG | OXYGEN SATURATION: 98 % | RESPIRATION RATE: 14 BRPM | WEIGHT: 124.56 LBS

## 2024-05-04 PROBLEM — R19.7 DIARRHEA: Status: RESOLVED | Noted: 2024-05-03 | Resolved: 2024-05-04

## 2024-05-04 LAB
C DIFF GDH + TOXINS A+B STL QL IA.RAPID: NEGATIVE
CAMPYLOBACTER DNA SPEC NAA+PROBE: NORMAL
EKG ATRIAL RATE: 98 BPM
EKG P AXIS: 73 DEGREES
EKG P-R INTERVAL: 158 MS
EKG Q-T INTERVAL: 334 MS
EKG QRS DURATION: 80 MS
EKG QTC CALCULATION (BAZETT): 426 MS
EKG R AXIS: 29 DEGREES
EKG T AXIS: 48 DEGREES
EKG VENTRICULAR RATE: 98 BPM
ERYTHROCYTE [DISTWIDTH] IN BLOOD BY AUTOMATED COUNT: 15.1 % (ref 12.5–15.4)
ETEC ELTA+ESTB GENES STL QL NAA+PROBE: NORMAL
HCT VFR BLD AUTO: 29.4 % (ref 36–46)
HGB BLD-MCNC: 9.8 G/DL (ref 12–16)
LIPASE SERPL-CCNC: 63 U/L (ref 13–60)
MCH RBC QN AUTO: 32.4 PG (ref 26–34)
MCHC RBC AUTO-ENTMCNC: 33.4 G/DL (ref 31–37)
MCV RBC AUTO: 97.1 FL (ref 80–100)
P SHIGELLOIDES DNA STL QL NAA+PROBE: NORMAL
PLATELET # BLD AUTO: 647 K/UL (ref 140–450)
PMV BLD AUTO: 6 FL (ref 6–12)
RBC # BLD AUTO: 3.03 M/UL (ref 4–5.2)
SALMONELLA DNA SPEC QL NAA+PROBE: NORMAL
SHIGA TOXIN STX GENE SPEC NAA+PROBE: NORMAL
SHIGELLA DNA SPEC QL NAA+PROBE: NORMAL
SODIUM SERPL-SCNC: 131 MMOL/L (ref 135–144)
SODIUM SERPL-SCNC: 133 MMOL/L (ref 135–144)
SPECIMEN DESCRIPTION: NORMAL
SPECIMEN DESCRIPTION: NORMAL
V CHOL+PARA RFBL+TRKH+TNAA STL QL NAA+PR: NORMAL
WBC OTHER # BLD: 10.3 K/UL (ref 3.5–11)
Y ENTERO RECN STL QL NAA+PROBE: NORMAL

## 2024-05-04 PROCEDURE — 36415 COLL VENOUS BLD VENIPUNCTURE: CPT

## 2024-05-04 PROCEDURE — 83690 ASSAY OF LIPASE: CPT

## 2024-05-04 PROCEDURE — 6370000000 HC RX 637 (ALT 250 FOR IP): Performed by: STUDENT IN AN ORGANIZED HEALTH CARE EDUCATION/TRAINING PROGRAM

## 2024-05-04 PROCEDURE — 2580000003 HC RX 258: Performed by: STUDENT IN AN ORGANIZED HEALTH CARE EDUCATION/TRAINING PROGRAM

## 2024-05-04 PROCEDURE — 6360000002 HC RX W HCPCS: Performed by: STUDENT IN AN ORGANIZED HEALTH CARE EDUCATION/TRAINING PROGRAM

## 2024-05-04 PROCEDURE — 6360000002 HC RX W HCPCS: Performed by: NURSE PRACTITIONER

## 2024-05-04 PROCEDURE — 2580000003 HC RX 258: Performed by: NURSE PRACTITIONER

## 2024-05-04 PROCEDURE — 2500000003 HC RX 250 WO HCPCS: Performed by: NURSE PRACTITIONER

## 2024-05-04 PROCEDURE — 85027 COMPLETE CBC AUTOMATED: CPT

## 2024-05-04 PROCEDURE — 93005 ELECTROCARDIOGRAM TRACING: CPT | Performed by: EMERGENCY MEDICINE

## 2024-05-04 PROCEDURE — 84295 ASSAY OF SERUM SODIUM: CPT

## 2024-05-04 PROCEDURE — 99232 SBSQ HOSP IP/OBS MODERATE 35: CPT | Performed by: STUDENT IN AN ORGANIZED HEALTH CARE EDUCATION/TRAINING PROGRAM

## 2024-05-04 PROCEDURE — A4216 STERILE WATER/SALINE, 10 ML: HCPCS | Performed by: NURSE PRACTITIONER

## 2024-05-04 RX ORDER — METRONIDAZOLE 500 MG/1
500 TABLET ORAL 3 TIMES DAILY
Qty: 30 TABLET | Refills: 0 | Status: SHIPPED | OUTPATIENT
Start: 2024-05-04 | End: 2024-05-14

## 2024-05-04 RX ORDER — CIPROFLOXACIN 500 MG/1
500 TABLET, FILM COATED ORAL 2 TIMES DAILY
Qty: 14 TABLET | Refills: 0 | Status: SHIPPED | OUTPATIENT
Start: 2024-05-04 | End: 2024-05-11

## 2024-05-04 RX ADMIN — METRONIDAZOLE 500 MG: 500 INJECTION, SOLUTION INTRAVENOUS at 02:54

## 2024-05-04 RX ADMIN — ENOXAPARIN SODIUM 30 MG: 100 INJECTION SUBCUTANEOUS at 08:41

## 2024-05-04 RX ADMIN — SODIUM CHLORIDE, PRESERVATIVE FREE 10 ML: 5 INJECTION INTRAVENOUS at 08:51

## 2024-05-04 RX ADMIN — LISINOPRIL 20 MG: 20 TABLET ORAL at 08:41

## 2024-05-04 RX ADMIN — METRONIDAZOLE 500 MG: 500 INJECTION, SOLUTION INTRAVENOUS at 14:08

## 2024-05-04 RX ADMIN — SODIUM CHLORIDE: 9 INJECTION, SOLUTION INTRAVENOUS at 02:53

## 2024-05-04 RX ADMIN — CIPROFLOXACIN 400 MG: 400 INJECTION, SOLUTION INTRAVENOUS at 07:16

## 2024-05-04 RX ADMIN — FAMOTIDINE 20 MG: 10 INJECTION, SOLUTION INTRAVENOUS at 08:48

## 2024-05-04 RX ADMIN — HYDROCHLOROTHIAZIDE 25 MG: 25 TABLET ORAL at 08:41

## 2024-05-04 NOTE — PROGRESS NOTES
Physical Exam:     General Appearance: alert and oriented to person, place and time, well-developed and well-nourished, in no acute distress  Skin: warm and dry, no rash or erythema  Head: normocephalic and atraumatic  Eyes: pupils equal, round, and reactive to light, extraocular eye movements intact, conjunctivae normal  ENT: hearing grossly normal bilaterally  Neck: neck supple and non tender without mass, no thyromegaly or thyroid nodules, no cervical lymphadenopathy   Pulmonary/Chest: clear to auscultation bilaterally- no wheezes, rales or rhonchi, normal air movement, no respiratory distress  Cardiovascular: normal rate, regular rhythm, normal S1 and S2, no murmurs, rubs, clicks or gallops, distal pulses intact, no carotid bruits  Abdomen: soft, non-tender, non-distended, normal bowel sounds, no masses or organomegaly  Extremities: no cyanosis, clubbing or edema  Musculoskeletal: normal range of motion, no joint swelling, deformity or tenderness  Neurologic: no cranial nerve deficit and muscle strength normal    Lab and Imaging Review     CBC  Recent Labs     05/02/24  1550 05/03/24  0306 05/04/24  1008   WBC 16.3* 14.1* 10.3   HGB 11.9* 10.1* 9.8*   HCT 34.8* 29.4* 29.4*   MCV 96.0 96.8 97.1   * 603* 647*       BMP  Recent Labs     05/02/24  1550 05/02/24  2118 05/03/24  0306 05/03/24  0845 05/03/24 2010 05/04/24  0258 05/04/24  0824   *   < > 129*   < > 131* 131* 133*   K 4.1  --  4.1  --   --   --   --    CL 84*  --  97*  --   --   --   --    CO2 19*  --  19*  --   --   --   --    BUN 31*  --  27*  --   --   --   --    CREATININE 1.6*  --  1.3*  --   --   --   --    GLUCOSE 114*  --  100*  --   --   --   --    CALCIUM 10.0  --  8.8  --   --   --   --    MG 1.7  --   --   --   --   --   --     < > = values in this interval not displayed.       LFTS  Recent Labs     05/02/24  1550   ALKPHOS 95   ALT 11   AST 21   BILITOT 0.3       AMYLASE/LIPASE/AMMONIA  Recent Labs     05/02/24  1550  05/04/24  1008   LIPASE 296* 63*     Ct abd 5/2/24  FINDINGS:  Lower Chest: Patchy bilateral airspace opacities are present in the lung  bases.     Organs: Status post cholecystectomy.  The liver, spleen, kidneys, adrenals,  pancreas, bile ducts, and stomach are without acute process.  Small hiatal  hernia.  The ureters are nondilated.  The bladder is within normal limits.     GI/Bowel: Postoperative changes along the proximal colon.  No evidence of  bowel obstruction.  There is wall thickening with adjacent fat stranding  involving the 2nd portion of the duodenum.     Pelvis: No adnexal mass.     Peritoneum/Retroperitoneum: Small amount of ascites.  Moderate  atherosclerosis.  Patent vasculature.  No lymphadenopathy.  No free air.     Bones/Soft Tissues: No acute osseous abnormality.     IMPRESSION:  1. Changes are present in the second portion of the duodenum which can be  seen with duodenitis.  No evidence of acute inflammatory changes seen more  distally in the small bowel.  2. Small amount of ascites.  3. Patchy bilateral airspace opacities in the lung bases, most concerning for  an infectious process.  Close radiographic follow-up until resolution is  recommended.    ASSESSMENT/plan  Diarrhea with abdominal cramping, hx crohns improved. Pt is on imuran. C diff testing is negative, stool cultures and calprotectin level are pending  Diet as tolerated  Will not start steroid  If stool cultures negative may be discharged from a gi standpoint, she will need f/u with her GI MD at [facility]     2.elevated lipase, improved, no c/o abdominal pain, no pancreatitis on ct, doubt pancreatitis    Message sent to dr alvarez    This plan was formulated in collaboration with Dr. Wells  .    Electronically signed by: GIOVANNA Conrad CNP on 5/4/2024 at 10:57 AM

## 2024-05-04 NOTE — DISCHARGE INSTRUCTIONS
Follow-up outpatient with PCP and primary GI.  Continue medications as prescribed.  Patient does not have PCP listed in her chart, please provide list of PCPs to her, she can pick 1 and follow-up outpatient with them.  Patient's lab work showed elevated platelets, could be reactive.  Need outpatient follow-up with hematology for further workup.

## 2024-05-04 NOTE — PLAN OF CARE
Problem: Discharge Planning  Goal: Discharge to home or other facility with appropriate resources  5/4/2024 0422 by Awa Camarillo, RN  Outcome: Progressing  Flowsheets (Taken 5/3/2024 2000)  Discharge to home or other facility with appropriate resources: Identify barriers to discharge with patient and caregiver     Problem: Safety - Adult  Goal: Free from fall injury  5/4/2024 0422 by Awa Camarillo, RN  Outcome: Progressing  Flowsheets (Taken 5/3/2024 2000)  Free From Fall Injury: Instruct family/caregiver on patient safety

## 2024-05-04 NOTE — PLAN OF CARE
Problem: Discharge Planning  Goal: Discharge to home or other facility with appropriate resources  5/4/2024 1544 by Derrick Murray RN  Outcome: Completed  5/4/2024 1510 by Derrick Murray RN  Outcome: Progressing  5/4/2024 0422 by Awa Camarillo RN  Outcome: Progressing  Flowsheets (Taken 5/3/2024 2000)  Discharge to home or other facility with appropriate resources: Identify barriers to discharge with patient and caregiver     Problem: Safety - Adult  Goal: Free from fall injury  5/4/2024 1544 by Derrick Murray RN  Outcome: Completed  5/4/2024 1510 by Derrick Murray RN  Outcome: Adequate for Discharge  5/4/2024 0422 by Awa Camarillo RN  Outcome: Progressing  Flowsheets (Taken 5/3/2024 2000)  Free From Fall Injury: Instruct family/caregiver on patient safety

## 2024-05-04 NOTE — PLAN OF CARE
Problem: Discharge Planning  Goal: Discharge to home or other facility with appropriate resources  5/4/2024 1510 by Derrick Murray, RN  Outcome: Progressing  5/4/2024 0422 by Awa Camarillo RN  Outcome: Progressing  Flowsheets (Taken 5/3/2024 2000)  Discharge to home or other facility with appropriate resources: Identify barriers to discharge with patient and caregiver     Problem: Safety - Adult  Goal: Free from fall injury  5/4/2024 1510 by Derrick Murray, RN  Outcome: Adequate for Discharge  5/4/2024 0422 by Awa Camarillo RN  Outcome: Progressing  Flowsheets (Taken 5/3/2024 2000)  Free From Fall Injury: Instruct family/caregiver on patient safety

## 2024-05-04 NOTE — DISCHARGE SUMMARY
[facility]  Office: [phone]  Willis Sutherland DO, Edu Sood DO, Jett Ramirez DO, Grzegorz Sterling DO, Tri Croft MD, Melodie Hatfield MD, Aleyda Lopez MD, Ailyn Diggs MD,  Robe Clarke MD, Juani Hinojosa MD, Addi Mauro DO, Mana Day MD,  Esteban Wolf DO, Joe Pinedo MD, Yaw Torres MD, Carter Sutherland DO, Tonja Medina MD,  Noman Tovar DO, Nina Bates MD, Nai Ching MD, Candy Novak MD, Karol Patel MD,  Pj Jacobs MD, Charo Beltran MD, Ena Roth MD, Jasen Seth DO, Margo Whitmore MD,  Raymon Brito MD, Shirley Waterhouse, CNP,  Carmina Cummins, CNP, Namrata Beltran, CNP, Vineet Murry, CNP,  Christianne Chan, DNP, Swathi Paiz, CNP, Reanna Tolbert, CNP, Kelsea Huang, CNP, Lydia Esquivel, CNP, Arabella Dick, CNP, Chad Gaston PA-C, Linda Hernandez, CNS, Luann Pretty, CNP, Kendy Perez, CNP         [facility]   IN-PATIENT SERVICE   [facility]    Discharge Summary     Patient ID: Valerie Young  :  1940   MRN: [id number]     ACCOUNT:  [id number]   Patient's PCP: No primary care provider on file.  Admit Date: 2024   Discharge Date: 2024  Length of Stay: 2  Code Status:  Full Code  Admitting Physician: Angelo Gonzalez MD  Discharge Physician: Angelo Gonzalez MD     Active Discharge Diagnoses:     Hospital Problem Lists:  Principal Problem (Resolved):    Diarrhea  Active Problems:    Pneumonia due to infectious organism    Crohn's disease of small intestine without complication (HCC)    HTN (hypertension)    Thrombocytosis    Hyponatremia    YULIA (acute kidney injury) (HCC)    Gastritis and duodenitis    Metabolic acidosis    Acute pancreatitis      Admission Condition:  fair     Discharged Condition: good    Hospital Stay:     Hospital Course:  Valerie Young is a 84 y.o. female who was admitted for the management of Diarrhea , presented to ER with Diarrhea (Watery diarrhea onset a week ago wed- denies  seeing any blood in stool/Pt states anytime she eats or drinks it runs straight through her/The last two days there has been a decrease in her fluid and food intake), Abdominal Pain (Mid to lower abd intermittent cramping), Fatigue, and Cough (Onset a week ago tues- green phlegm /Cough is not as often per pt)    Patient with past medical history of Crohn's disease and hypertension now presented to the ER with diffuse watery diarrhea for the last 2 weeks.  Denied any blood in her stools.  No concern for fever or chills.  No recent sick contacts.  Denied nausea or vomiting.  Patient stated that she has lost appetite and weight significantly.  In the ER patient was afebrile, tachycardic with heart rate 102, /63.  Lab work showed sodium 125 which trended up to 131 this morning, creatinine 1.6 improved to 1.3.  Lipase 296 LFTs unremarkable.  WBC 16.3, hemoglobin 11.9, platelet 746.  She does have elevated CRP 38.2 and ESR 42.  Chest x-ray was unremarkable, CT abdomen pelvis showed finding concerning for infectious process.  Patient started on antibiotics and IV fluids due to concern for pneumonia and admitted on medicine floor for further management.  This morning C. difficile and GI panel were ordered and GI was consulted due to her ongoing diarrhea.    Significant therapeutic interventions:   Diarrhea: Patient has history of Crohn's disease, having loose stools for last 2 weeks, leukocytosis, elevated inflammatory markers.  Lipase 296.  Lactate 1.  Negative C. difficile and GI panel.  CT abdomen pelvis concerning for infectious process.  GI on board.  Follow-up on labs and GI recommendations.  Continue Cipro and Flagyl.  Concern for pneumonia: Ruled out.  Patient denies any complaint of shortness of breath or cough.  Chest findings unremarkable.  X-ray unremarkable.    History of Crohn's disease: Could be because of ongoing diarrhea.  Follow-up on lab work and GI recommendations.  Patient tolerating p.o. diet,

## 2024-05-05 LAB
EKG ATRIAL RATE: 82 BPM
EKG P AXIS: 79 DEGREES
EKG P-R INTERVAL: 168 MS
EKG Q-T INTERVAL: 360 MS
EKG QRS DURATION: 78 MS
EKG QTC CALCULATION (BAZETT): 420 MS
EKG R AXIS: 45 DEGREES
EKG T AXIS: 61 DEGREES
EKG VENTRICULAR RATE: 82 BPM

## 2024-05-07 LAB — CALPROTECTIN, FECAL: 106 UG/G
